# Patient Record
Sex: FEMALE | Race: WHITE | NOT HISPANIC OR LATINO | URBAN - METROPOLITAN AREA
[De-identification: names, ages, dates, MRNs, and addresses within clinical notes are randomized per-mention and may not be internally consistent; named-entity substitution may affect disease eponyms.]

---

## 2016-02-02 RX ORDER — WARFARIN SODIUM 2.5 MG/1
1 TABLET ORAL
Qty: 0 | Refills: 0 | DISCHARGE
Start: 2016-02-02

## 2020-03-09 ENCOUNTER — EMERGENCY (EMERGENCY)
Facility: HOSPITAL | Age: 85
LOS: 1 days | Discharge: LEFT BEFORE DISCHARGE | End: 2020-03-09
Attending: EMERGENCY MEDICINE
Payer: MEDICARE

## 2020-03-09 VITALS
DIASTOLIC BLOOD PRESSURE: 56 MMHG | TEMPERATURE: 98 F | RESPIRATION RATE: 16 BRPM | SYSTOLIC BLOOD PRESSURE: 100 MMHG | WEIGHT: 130.07 LBS | HEART RATE: 75 BPM | OXYGEN SATURATION: 98 %

## 2020-03-09 VITALS
DIASTOLIC BLOOD PRESSURE: 62 MMHG | TEMPERATURE: 98 F | RESPIRATION RATE: 16 BRPM | SYSTOLIC BLOOD PRESSURE: 109 MMHG | HEART RATE: 89 BPM | OXYGEN SATURATION: 98 %

## 2020-03-09 LAB
ALBUMIN SERPL ELPH-MCNC: 3.4 G/DL — LOW (ref 3.5–5)
ALP SERPL-CCNC: 104 U/L — SIGNIFICANT CHANGE UP (ref 40–120)
ALT FLD-CCNC: 21 U/L DA — SIGNIFICANT CHANGE UP (ref 10–60)
ANION GAP SERPL CALC-SCNC: 7 MMOL/L — SIGNIFICANT CHANGE UP (ref 5–17)
APTT BLD: 35 SEC — SIGNIFICANT CHANGE UP (ref 27.5–36.3)
AST SERPL-CCNC: 14 U/L — SIGNIFICANT CHANGE UP (ref 10–40)
BILIRUB SERPL-MCNC: 0.4 MG/DL — SIGNIFICANT CHANGE UP (ref 0.2–1.2)
BUN SERPL-MCNC: 16 MG/DL — SIGNIFICANT CHANGE UP (ref 7–18)
CALCIUM SERPL-MCNC: 8.9 MG/DL — SIGNIFICANT CHANGE UP (ref 8.4–10.5)
CHLORIDE SERPL-SCNC: 100 MMOL/L — SIGNIFICANT CHANGE UP (ref 96–108)
CO2 SERPL-SCNC: 29 MMOL/L — SIGNIFICANT CHANGE UP (ref 22–31)
CREAT SERPL-MCNC: 0.93 MG/DL — SIGNIFICANT CHANGE UP (ref 0.5–1.3)
DIGOXIN SERPL-MCNC: 0.8 NG/ML — SIGNIFICANT CHANGE UP (ref 0.8–2)
GLUCOSE SERPL-MCNC: 158 MG/DL — HIGH (ref 70–99)
HCT VFR BLD CALC: 34.3 % — LOW (ref 34.5–45)
HGB BLD-MCNC: 10.7 G/DL — LOW (ref 11.5–15.5)
INR BLD: 1.76 RATIO — HIGH (ref 0.88–1.16)
MAGNESIUM SERPL-MCNC: 2.3 MG/DL — SIGNIFICANT CHANGE UP (ref 1.6–2.6)
MCHC RBC-ENTMCNC: 25.7 PG — LOW (ref 27–34)
MCHC RBC-ENTMCNC: 31.2 GM/DL — LOW (ref 32–36)
MCV RBC AUTO: 82.3 FL — SIGNIFICANT CHANGE UP (ref 80–100)
NRBC # BLD: 0 /100 WBCS — SIGNIFICANT CHANGE UP (ref 0–0)
PLATELET # BLD AUTO: 268 K/UL — SIGNIFICANT CHANGE UP (ref 150–400)
POTASSIUM SERPL-MCNC: 3.9 MMOL/L — SIGNIFICANT CHANGE UP (ref 3.5–5.3)
POTASSIUM SERPL-SCNC: 3.9 MMOL/L — SIGNIFICANT CHANGE UP (ref 3.5–5.3)
PROT SERPL-MCNC: 8.1 G/DL — SIGNIFICANT CHANGE UP (ref 6–8.3)
PROTHROM AB SERPL-ACNC: 19.9 SEC — HIGH (ref 10–12.9)
RBC # BLD: 4.17 M/UL — SIGNIFICANT CHANGE UP (ref 3.8–5.2)
RBC # FLD: 14.3 % — SIGNIFICANT CHANGE UP (ref 10.3–14.5)
SODIUM SERPL-SCNC: 136 MMOL/L — SIGNIFICANT CHANGE UP (ref 135–145)
TROPONIN I SERPL-MCNC: <0.015 NG/ML — SIGNIFICANT CHANGE UP (ref 0–0.04)
WBC # BLD: 7.81 K/UL — SIGNIFICANT CHANGE UP (ref 3.8–10.5)
WBC # FLD AUTO: 7.81 K/UL — SIGNIFICANT CHANGE UP (ref 3.8–10.5)

## 2020-03-09 PROCEDURE — 85027 COMPLETE CBC AUTOMATED: CPT

## 2020-03-09 PROCEDURE — 80162 ASSAY OF DIGOXIN TOTAL: CPT

## 2020-03-09 PROCEDURE — 36415 COLL VENOUS BLD VENIPUNCTURE: CPT

## 2020-03-09 PROCEDURE — 99284 EMERGENCY DEPT VISIT MOD MDM: CPT

## 2020-03-09 PROCEDURE — 85610 PROTHROMBIN TIME: CPT

## 2020-03-09 PROCEDURE — 82962 GLUCOSE BLOOD TEST: CPT

## 2020-03-09 PROCEDURE — 84484 ASSAY OF TROPONIN QUANT: CPT

## 2020-03-09 PROCEDURE — 85730 THROMBOPLASTIN TIME PARTIAL: CPT

## 2020-03-09 PROCEDURE — 83735 ASSAY OF MAGNESIUM: CPT

## 2020-03-09 PROCEDURE — 80053 COMPREHEN METABOLIC PANEL: CPT

## 2020-03-09 PROCEDURE — 93005 ELECTROCARDIOGRAM TRACING: CPT

## 2020-03-09 PROCEDURE — 99283 EMERGENCY DEPT VISIT LOW MDM: CPT

## 2020-03-09 NOTE — ED PROVIDER NOTE - OBJECTIVE STATEMENT
89 y/o woman, h/ A-fib, DM, NHL, HTN, on digoxin, c/o generalized weakness and near-syncopal episode since about 4 pm today.  No CP/abd pain/SOB/fever/vomiting/focal weakness/injury.

## 2020-03-09 NOTE — ED PROVIDER NOTE - PMH
Afib    DM Type 2 (Diabetes Mellitus, Type 2)    History of Non-Hodgkin's Lymphoma    HTN (Hypertension)    Subdural Hemorrhage

## 2020-03-09 NOTE — ED PROVIDER NOTE - PATIENT PORTAL LINK FT
You can access the FollowMyHealth Patient Portal offered by [facility] by registering at the following website: [email or web address]. By joining [facility]’s FollowMyHealth portal, you will also be able to view your health information using other applications (apps) compatible with our system.

## 2020-03-09 NOTE — ED PROVIDER NOTE - CLINICAL SUMMARY MEDICAL DECISION MAKING FREE TEXT BOX
89 y/o woman, h/ A-fib, DM, NHL, HTN, on digoxin, c/o generalized weakness and near-syncopal episode since about 4 pm today--EKG, labs, reassess, anticipate admission.

## 2020-03-09 NOTE — ED PROVIDER NOTE - PROGRESS NOTE DETAILS
I advised Pt and her daughter that Pt should be admitted for generalized weakness and near-syncope, however they refuse.  They want to go to PMD Dr. Powell and specialist tomorrow.  I had extensive discussion of risks and benefits of pursuing further medical evaluation and/or care with patient and any available family/friends; patient still electing to leave against medical advice. Patient is awake, alert, oriented and demonstrates full capacity and insight into illness. Patient accepts responsibility for risks involved with leaving against medical advise as explained by me in detail.  Patient aware and encouraged to return immediately to ED or nearest ED if patient decides to change mind regarding care or if patient experiences any new, worsening, or concerning symptoms.

## 2020-03-09 NOTE — ED ADULT NURSE NOTE - NSIMPLEMENTINTERV_GEN_ALL_ED
Implemented All Fall Risk Interventions:  [place] to call system. Call bell, personal items and telephone within reach. Instruct patient to call for assistance. Room bathroom lighting operational. Non-slip footwear when patient is off stretcher. Physically safe environment: no spills, clutter or unnecessary equipment. Stretcher in lowest position, wheels locked, appropriate side rails in place. Provide visual cue, wrist band, yellow gown, etc. Monitor gait and stability. Monitor for mental status changes and reorient to person, place, and time. Review medications for side effects contributing to fall risk. Reinforce activity limits and safety measures with patient and family.

## 2021-05-28 ENCOUNTER — INPATIENT (INPATIENT)
Facility: HOSPITAL | Age: 86
LOS: 0 days | Discharge: HOME | DRG: 812 | End: 2021-05-29
Attending: INTERNAL MEDICINE | Admitting: INTERNAL MEDICINE
Payer: COMMERCIAL

## 2021-05-28 VITALS
TEMPERATURE: 98 F | OXYGEN SATURATION: 99 % | HEIGHT: 63 IN | RESPIRATION RATE: 16 BRPM | DIASTOLIC BLOOD PRESSURE: 60 MMHG | SYSTOLIC BLOOD PRESSURE: 124 MMHG | HEART RATE: 84 BPM

## 2021-05-28 LAB
ALBUMIN SERPL ELPH-MCNC: 3.1 G/DL — LOW (ref 3.5–5)
ALP SERPL-CCNC: 75 U/L — SIGNIFICANT CHANGE UP (ref 40–120)
ALT FLD-CCNC: 14 U/L DA — SIGNIFICANT CHANGE UP (ref 10–60)
ANION GAP SERPL CALC-SCNC: 9 MMOL/L — SIGNIFICANT CHANGE UP (ref 5–17)
APTT BLD: 33.8 SEC — SIGNIFICANT CHANGE UP (ref 27.5–35.5)
AST SERPL-CCNC: 18 U/L — SIGNIFICANT CHANGE UP (ref 10–40)
BASOPHILS # BLD AUTO: 0.02 K/UL — SIGNIFICANT CHANGE UP (ref 0–0.2)
BASOPHILS NFR BLD AUTO: 0.5 % — SIGNIFICANT CHANGE UP (ref 0–2)
BILIRUB SERPL-MCNC: 0.2 MG/DL — SIGNIFICANT CHANGE UP (ref 0.2–1.2)
BUN SERPL-MCNC: 26 MG/DL — HIGH (ref 7–18)
CALCIUM SERPL-MCNC: 8.5 MG/DL — SIGNIFICANT CHANGE UP (ref 8.4–10.5)
CHLORIDE SERPL-SCNC: 106 MMOL/L — SIGNIFICANT CHANGE UP (ref 96–108)
CO2 SERPL-SCNC: 22 MMOL/L — SIGNIFICANT CHANGE UP (ref 22–31)
CREAT SERPL-MCNC: 1.04 MG/DL — SIGNIFICANT CHANGE UP (ref 0.5–1.3)
EOSINOPHIL # BLD AUTO: 0.07 K/UL — SIGNIFICANT CHANGE UP (ref 0–0.5)
EOSINOPHIL NFR BLD AUTO: 1.6 % — SIGNIFICANT CHANGE UP (ref 0–6)
GLUCOSE SERPL-MCNC: 164 MG/DL — HIGH (ref 70–99)
HCT VFR BLD CALC: 18.7 % — CRITICAL LOW (ref 34.5–45)
HGB BLD-MCNC: 5.4 G/DL — CRITICAL LOW (ref 11.5–15.5)
IMM GRANULOCYTES NFR BLD AUTO: 0.5 % — SIGNIFICANT CHANGE UP (ref 0–1.5)
INR BLD: 2.59 RATIO — HIGH (ref 0.88–1.16)
LYMPHOCYTES # BLD AUTO: 0.78 K/UL — LOW (ref 1–3.3)
LYMPHOCYTES # BLD AUTO: 17.7 % — SIGNIFICANT CHANGE UP (ref 13–44)
MCHC RBC-ENTMCNC: 21.6 PG — LOW (ref 27–34)
MCHC RBC-ENTMCNC: 28.9 GM/DL — LOW (ref 32–36)
MCV RBC AUTO: 74.8 FL — LOW (ref 80–100)
MONOCYTES # BLD AUTO: 0.54 K/UL — SIGNIFICANT CHANGE UP (ref 0–0.9)
MONOCYTES NFR BLD AUTO: 12.3 % — SIGNIFICANT CHANGE UP (ref 2–14)
NEUTROPHILS # BLD AUTO: 2.97 K/UL — SIGNIFICANT CHANGE UP (ref 1.8–7.4)
NEUTROPHILS NFR BLD AUTO: 67.4 % — SIGNIFICANT CHANGE UP (ref 43–77)
NRBC # BLD: 0 /100 WBCS — SIGNIFICANT CHANGE UP (ref 0–0)
PLATELET # BLD AUTO: 228 K/UL — SIGNIFICANT CHANGE UP (ref 150–400)
POTASSIUM SERPL-MCNC: 4.3 MMOL/L — SIGNIFICANT CHANGE UP (ref 3.5–5.3)
POTASSIUM SERPL-SCNC: 4.3 MMOL/L — SIGNIFICANT CHANGE UP (ref 3.5–5.3)
PROT SERPL-MCNC: 6.7 G/DL — SIGNIFICANT CHANGE UP (ref 6–8.3)
PROTHROM AB SERPL-ACNC: 29.5 SEC — HIGH (ref 10.6–13.6)
RBC # BLD: 2.5 M/UL — LOW (ref 3.8–5.2)
RBC # FLD: 15.7 % — HIGH (ref 10.3–14.5)
SODIUM SERPL-SCNC: 137 MMOL/L — SIGNIFICANT CHANGE UP (ref 135–145)
WBC # BLD: 4.4 K/UL — SIGNIFICANT CHANGE UP (ref 3.8–10.5)
WBC # FLD AUTO: 4.4 K/UL — SIGNIFICANT CHANGE UP (ref 3.8–10.5)

## 2021-05-28 PROCEDURE — 99284 EMERGENCY DEPT VISIT MOD MDM: CPT

## 2021-05-28 NOTE — ED ADULT TRIAGE NOTE - ACCOMPANIED BY
Pt calling and stating the [facility] has sent us several faxes for cath renewal.  I do not see those faxes anywhere.  I gave pt our fax number and he is going to call [facility] where he gets his caths and have them refax us the order.  PRIYANKA Juarez, CMA     EMT/paramedic

## 2021-05-28 NOTE — ED ADULT NURSE NOTE - NSIMPLEMENTINTERV_GEN_ALL_ED
Notified of FTS within range through NTD.    Down Syndrome/ risk after screenin /> 10,000  Trisomy 18/13/ risk after screenin /> 10,000  ALDO-A 80%.  
Implemented All Fall with Harm Risk Interventions:  [place] to call system. Call bell, personal items and telephone within reach. Instruct patient to call for assistance. Room bathroom lighting operational. Non-slip footwear when patient is off stretcher. Physically safe environment: no spills, clutter or unnecessary equipment. Stretcher in lowest position, wheels locked, appropriate side rails in place. Provide visual cue, wrist band, yellow gown, etc. Monitor gait and stability. Monitor for mental status changes and reorient to person, place, and time. Review medications for side effects contributing to fall risk. Reinforce activity limits and safety measures with patient and family. Provide visual clues: red socks.

## 2021-05-28 NOTE — ED PROVIDER NOTE - OBJECTIVE STATEMENT
89 year old female PMH dementia, afib on coumadin, HTN, DM coming in with anemia. Hgb is 5.5 on outpatient labs. As per pts family has hx of iron deficiency anemia and was on iron therapy for awhile and her hgb was good but most recent hgb decreased. States she has seen a GI doctor and had an endoscopy somewhat recently and no bleeding site was identified. Pt states she feels very tired but denies all other complaints. as per family no abd pains, N/V/D/C, melena, hematuria, cp, sob, palpitations.

## 2021-05-29 ENCOUNTER — TRANSCRIPTION ENCOUNTER (OUTPATIENT)
Age: 86
End: 2021-05-29

## 2021-05-29 VITALS
TEMPERATURE: 98 F | RESPIRATION RATE: 16 BRPM | HEART RATE: 92 BPM | DIASTOLIC BLOOD PRESSURE: 74 MMHG | SYSTOLIC BLOOD PRESSURE: 140 MMHG | OXYGEN SATURATION: 100 %

## 2021-05-29 DIAGNOSIS — I10 ESSENTIAL (PRIMARY) HYPERTENSION: ICD-10-CM

## 2021-05-29 DIAGNOSIS — I48.91 UNSPECIFIED ATRIAL FIBRILLATION: ICD-10-CM

## 2021-05-29 DIAGNOSIS — D64.9 ANEMIA, UNSPECIFIED: ICD-10-CM

## 2021-05-29 DIAGNOSIS — Z98.890 OTHER SPECIFIED POSTPROCEDURAL STATES: Chronic | ICD-10-CM

## 2021-05-29 DIAGNOSIS — E11.9 TYPE 2 DIABETES MELLITUS WITHOUT COMPLICATIONS: ICD-10-CM

## 2021-05-29 DIAGNOSIS — Z29.9 ENCOUNTER FOR PROPHYLACTIC MEASURES, UNSPECIFIED: ICD-10-CM

## 2021-05-29 LAB
APPEARANCE UR: CLEAR — SIGNIFICANT CHANGE UP
BILIRUB UR-MCNC: NEGATIVE — SIGNIFICANT CHANGE UP
COLOR SPEC: YELLOW — SIGNIFICANT CHANGE UP
DIFF PNL FLD: ABNORMAL
FERRITIN SERPL-MCNC: 8 NG/ML — LOW (ref 15–150)
FOLATE SERPL-MCNC: 9.7 NG/ML — SIGNIFICANT CHANGE UP
GLUCOSE BLDC GLUCOMTR-MCNC: 139 MG/DL — HIGH (ref 70–99)
GLUCOSE BLDC GLUCOMTR-MCNC: 144 MG/DL — HIGH (ref 70–99)
GLUCOSE BLDC GLUCOMTR-MCNC: 175 MG/DL — HIGH (ref 70–99)
GLUCOSE UR QL: NEGATIVE — SIGNIFICANT CHANGE UP
HAPTOGLOB SERPL-MCNC: 157 MG/DL — SIGNIFICANT CHANGE UP (ref 34–200)
HCT VFR BLD CALC: 23.7 % — LOW (ref 34.5–45)
HCT VFR BLD CALC: 31 % — LOW (ref 34.5–45)
HGB BLD-MCNC: 7.2 G/DL — LOW (ref 11.5–15.5)
HGB BLD-MCNC: 9.5 G/DL — LOW (ref 11.5–15.5)
IRON SATN MFR SERPL: 13 UG/DL — LOW (ref 40–150)
IRON SATN MFR SERPL: 2 % — LOW (ref 15–50)
KETONES UR-MCNC: NEGATIVE — SIGNIFICANT CHANGE UP
LDH SERPL L TO P-CCNC: 161 U/L — SIGNIFICANT CHANGE UP (ref 120–225)
LEUKOCYTE ESTERASE UR-ACNC: ABNORMAL
MCHC RBC-ENTMCNC: 23.4 PG — LOW (ref 27–34)
MCHC RBC-ENTMCNC: 24.4 PG — LOW (ref 27–34)
MCHC RBC-ENTMCNC: 30.4 GM/DL — LOW (ref 32–36)
MCHC RBC-ENTMCNC: 30.6 GM/DL — LOW (ref 32–36)
MCV RBC AUTO: 76.9 FL — LOW (ref 80–100)
MCV RBC AUTO: 79.5 FL — LOW (ref 80–100)
NITRITE UR-MCNC: NEGATIVE — SIGNIFICANT CHANGE UP
NRBC # BLD: 0 /100 WBCS — SIGNIFICANT CHANGE UP (ref 0–0)
NRBC # BLD: 0 /100 WBCS — SIGNIFICANT CHANGE UP (ref 0–0)
PH UR: 5 — SIGNIFICANT CHANGE UP (ref 5–8)
PLATELET # BLD AUTO: 211 K/UL — SIGNIFICANT CHANGE UP (ref 150–400)
PLATELET # BLD AUTO: 252 K/UL — SIGNIFICANT CHANGE UP (ref 150–400)
PROT UR-MCNC: NEGATIVE — SIGNIFICANT CHANGE UP
RBC # BLD: 2.46 M/UL — LOW (ref 3.8–5.2)
RBC # BLD: 3.08 M/UL — LOW (ref 3.8–5.2)
RBC # BLD: 3.9 M/UL — SIGNIFICANT CHANGE UP (ref 3.8–5.2)
RBC # FLD: 16.6 % — HIGH (ref 10.3–14.5)
RBC # FLD: 17.2 % — HIGH (ref 10.3–14.5)
RETICS #: 46.2 K/UL — SIGNIFICANT CHANGE UP (ref 25–125)
RETICS/RBC NFR: 1.9 % — SIGNIFICANT CHANGE UP (ref 0.5–2.5)
SARS-COV-2 RNA SPEC QL NAA+PROBE: SIGNIFICANT CHANGE UP
SP GR SPEC: 1.02 — SIGNIFICANT CHANGE UP (ref 1.01–1.02)
TIBC SERPL-MCNC: 519 UG/DL — HIGH (ref 250–450)
UIBC SERPL-MCNC: 506 UG/DL — HIGH (ref 110–370)
UROBILINOGEN FLD QL: NEGATIVE — SIGNIFICANT CHANGE UP
WBC # BLD: 5.38 K/UL — SIGNIFICANT CHANGE UP (ref 3.8–10.5)
WBC # BLD: 8.27 K/UL — SIGNIFICANT CHANGE UP (ref 3.8–10.5)
WBC # FLD AUTO: 5.38 K/UL — SIGNIFICANT CHANGE UP (ref 3.8–10.5)
WBC # FLD AUTO: 8.27 K/UL — SIGNIFICANT CHANGE UP (ref 3.8–10.5)

## 2021-05-29 PROCEDURE — 80053 COMPREHEN METABOLIC PANEL: CPT

## 2021-05-29 PROCEDURE — 82746 ASSAY OF FOLIC ACID SERUM: CPT

## 2021-05-29 PROCEDURE — 85045 AUTOMATED RETICULOCYTE COUNT: CPT

## 2021-05-29 PROCEDURE — 85025 COMPLETE CBC W/AUTO DIFF WBC: CPT

## 2021-05-29 PROCEDURE — 99285 EMERGENCY DEPT VISIT HI MDM: CPT

## 2021-05-29 PROCEDURE — 83540 ASSAY OF IRON: CPT

## 2021-05-29 PROCEDURE — 36430 TRANSFUSION BLD/BLD COMPNT: CPT

## 2021-05-29 PROCEDURE — 82962 GLUCOSE BLOOD TEST: CPT

## 2021-05-29 PROCEDURE — 93005 ELECTROCARDIOGRAM TRACING: CPT

## 2021-05-29 PROCEDURE — 86923 COMPATIBILITY TEST ELECTRIC: CPT

## 2021-05-29 PROCEDURE — 87077 CULTURE AEROBIC IDENTIFY: CPT

## 2021-05-29 PROCEDURE — 93010 ELECTROCARDIOGRAM REPORT: CPT

## 2021-05-29 PROCEDURE — P9040: CPT

## 2021-05-29 PROCEDURE — 87186 SC STD MICRODIL/AGAR DIL: CPT

## 2021-05-29 PROCEDURE — 86900 BLOOD TYPING SEROLOGIC ABO: CPT

## 2021-05-29 PROCEDURE — 85027 COMPLETE CBC AUTOMATED: CPT

## 2021-05-29 PROCEDURE — 85730 THROMBOPLASTIN TIME PARTIAL: CPT

## 2021-05-29 PROCEDURE — 86901 BLOOD TYPING SEROLOGIC RH(D): CPT

## 2021-05-29 PROCEDURE — 83010 ASSAY OF HAPTOGLOBIN QUANT: CPT

## 2021-05-29 PROCEDURE — 87635 SARS-COV-2 COVID-19 AMP PRB: CPT

## 2021-05-29 PROCEDURE — 81001 URINALYSIS AUTO W/SCOPE: CPT

## 2021-05-29 PROCEDURE — 86850 RBC ANTIBODY SCREEN: CPT

## 2021-05-29 PROCEDURE — 87086 URINE CULTURE/COLONY COUNT: CPT

## 2021-05-29 PROCEDURE — 82728 ASSAY OF FERRITIN: CPT

## 2021-05-29 PROCEDURE — 36415 COLL VENOUS BLD VENIPUNCTURE: CPT

## 2021-05-29 PROCEDURE — 83550 IRON BINDING TEST: CPT

## 2021-05-29 PROCEDURE — 85610 PROTHROMBIN TIME: CPT

## 2021-05-29 PROCEDURE — 83615 LACTATE (LD) (LDH) ENZYME: CPT

## 2021-05-29 RX ORDER — PANTOPRAZOLE SODIUM 20 MG/1
1 TABLET, DELAYED RELEASE ORAL
Qty: 30 | Refills: 0
Start: 2021-05-29 | End: 2021-06-27

## 2021-05-29 RX ORDER — DIGOXIN 250 MCG
125 TABLET ORAL DAILY
Refills: 0 | Status: DISCONTINUED | OUTPATIENT
Start: 2021-05-29 | End: 2021-05-29

## 2021-05-29 RX ORDER — FERROUS SULFATE 325(65) MG
1 TABLET ORAL
Qty: 60 | Refills: 0
Start: 2021-05-29 | End: 2021-06-27

## 2021-05-29 RX ORDER — LANOLIN ALCOHOL/MO/W.PET/CERES
1 CREAM (GRAM) TOPICAL
Qty: 0 | Refills: 0 | DISCHARGE
Start: 2021-05-29

## 2021-05-29 RX ORDER — FUROSEMIDE 40 MG
20 TABLET ORAL ONCE
Refills: 0 | Status: COMPLETED | OUTPATIENT
Start: 2021-05-29 | End: 2021-05-29

## 2021-05-29 RX ORDER — HYDRALAZINE HCL 50 MG
12.5 TABLET ORAL DAILY
Refills: 0 | Status: DISCONTINUED | OUTPATIENT
Start: 2021-05-29 | End: 2021-05-29

## 2021-05-29 RX ORDER — INSULIN LISPRO 100/ML
VIAL (ML) SUBCUTANEOUS AT BEDTIME
Refills: 0 | Status: DISCONTINUED | OUTPATIENT
Start: 2021-05-29 | End: 2021-05-29

## 2021-05-29 RX ORDER — LANOLIN ALCOHOL/MO/W.PET/CERES
3 CREAM (GRAM) TOPICAL AT BEDTIME
Refills: 0 | Status: DISCONTINUED | OUTPATIENT
Start: 2021-05-29 | End: 2021-05-29

## 2021-05-29 RX ORDER — IRON SUCROSE 20 MG/ML
200 INJECTION, SOLUTION INTRAVENOUS ONCE
Refills: 0 | Status: COMPLETED | OUTPATIENT
Start: 2021-05-29 | End: 2021-05-29

## 2021-05-29 RX ORDER — LOSARTAN POTASSIUM 100 MG/1
50 TABLET, FILM COATED ORAL DAILY
Refills: 0 | Status: DISCONTINUED | OUTPATIENT
Start: 2021-05-29 | End: 2021-05-29

## 2021-05-29 RX ORDER — WARFARIN SODIUM 2.5 MG/1
1 TABLET ORAL
Qty: 0 | Refills: 0 | DISCHARGE

## 2021-05-29 RX ORDER — LOSARTAN POTASSIUM 100 MG/1
1 TABLET, FILM COATED ORAL
Qty: 0 | Refills: 0 | DISCHARGE
Start: 2021-05-29

## 2021-05-29 RX ORDER — IRON SUCROSE 20 MG/ML
200 INJECTION, SOLUTION INTRAVENOUS ONCE
Refills: 0 | Status: DISCONTINUED | OUTPATIENT
Start: 2021-05-29 | End: 2021-05-29

## 2021-05-29 RX ORDER — METFORMIN HYDROCHLORIDE 850 MG/1
0 TABLET ORAL
Qty: 0 | Refills: 0 | DISCHARGE

## 2021-05-29 RX ORDER — INSULIN LISPRO 100/ML
VIAL (ML) SUBCUTANEOUS
Refills: 0 | Status: DISCONTINUED | OUTPATIENT
Start: 2021-05-29 | End: 2021-05-29

## 2021-05-29 RX ORDER — WARFARIN SODIUM 2.5 MG/1
2 TABLET ORAL AT BEDTIME
Refills: 0 | Status: DISCONTINUED | OUTPATIENT
Start: 2021-05-29 | End: 2021-05-29

## 2021-05-29 RX ORDER — PANTOPRAZOLE SODIUM 20 MG/1
40 TABLET, DELAYED RELEASE ORAL
Refills: 0 | Status: DISCONTINUED | OUTPATIENT
Start: 2021-05-29 | End: 2021-05-29

## 2021-05-29 RX ORDER — METFORMIN HYDROCHLORIDE 850 MG/1
1 TABLET ORAL
Qty: 0 | Refills: 0 | DISCHARGE

## 2021-05-29 RX ADMIN — PANTOPRAZOLE SODIUM 40 MILLIGRAM(S): 20 TABLET, DELAYED RELEASE ORAL at 06:04

## 2021-05-29 RX ADMIN — Medication 20 MILLIGRAM(S): at 14:28

## 2021-05-29 RX ADMIN — Medication 12.5 MILLIGRAM(S): at 06:35

## 2021-05-29 RX ADMIN — LOSARTAN POTASSIUM 50 MILLIGRAM(S): 100 TABLET, FILM COATED ORAL at 06:02

## 2021-05-29 RX ADMIN — IRON SUCROSE 110 MILLIGRAM(S): 20 INJECTION, SOLUTION INTRAVENOUS at 08:51

## 2021-05-29 RX ADMIN — Medication 125 MICROGRAM(S): at 06:02

## 2021-05-29 NOTE — DISCHARGE NOTE PROVIDER - NSDCCPCAREPLAN_GEN_ALL_CORE_FT
PRINCIPAL DISCHARGE DIAGNOSIS  Diagnosis: Anemia, unspecified type  Assessment and Plan of Treatment:       SECONDARY DISCHARGE DIAGNOSES  Diagnosis: Afib  Assessment and Plan of Treatment: Afib     PRINCIPAL DISCHARGE DIAGNOSIS  Diagnosis: Anemia, unspecified type  Assessment and Plan of Treatment: Symptoms to report, bleeding, palpitations, fatigue, pale skin, cold skin, dizziness. Take medications as ordered by PCP        SECONDARY DISCHARGE DIAGNOSES  Diagnosis: Afib  Assessment and Plan of Treatment: Atrial fibrillation is the most common heart rhythm problem.  The condition puts you at risk for has stroke and heart attack  It helps if you control your blood pressure, not drink more than 1-2 alcohol drinks per day, cut down on caffeine, getting treatment for over active thyroid gland, and get regular exercise  Call your doctor if you feel your heart racing or beating unusually, chest tightness or pain, lightheaded, faint, shortness of breath especially with exercise  It is important to take your heart medication as prescribed  You may be on anticoagulation which is very important to take as directed - you may need blood work to monitor drug levels  -Hold your coumadin tonight on 5/29/21 and you should resume it on your normal schedule on 5/30/21.       PRINCIPAL DISCHARGE DIAGNOSIS  Diagnosis: Anemia, unspecified type  Assessment and Plan of Treatment: Symptoms to report, bleeding, palpitations, fatigue, pale skin, cold skin, dizziness. Take medications as ordered by PCP.  You were found to have iron deficiency anemia, most likely from a bleeding source in your gastrointestinal system.  You were given 2 units of blood, started on a proton pump inhibitor, and started on iron supplementation.        SECONDARY DISCHARGE DIAGNOSES  Diagnosis: Afib  Assessment and Plan of Treatment: Atrial fibrillation is the most common heart rhythm problem.  The condition puts you at risk for has stroke and heart attack  It helps if you control your blood pressure, not drink more than 1-2 alcohol drinks per day, cut down on caffeine, getting treatment for over active thyroid gland, and get regular exercise  Call your doctor if you feel your heart racing or beating unusually, chest tightness or pain, lightheaded, faint, shortness of breath especially with exercise  It is important to take your heart medication as prescribed  You may be on anticoagulation which is very important to take as directed - you may need blood work to monitor drug levels  -Hold your coumadin tonight on 5/29/21 and you should resume it on your normal schedule on 5/30/21.

## 2021-05-29 NOTE — H&P ADULT - PROBLEM SELECTOR PLAN 5
IMPROVE VTE Individual Risk Assessment  RISK                                                                Points  [  ] Previous VTE                                                  3  [  ] Thrombophilia                                               2  [  ] Lower limb paralysis                                      2        (unable to hold up >15 seconds)    [  ] Current Cancer                                              2         (within 6 months)  [ x ] Immobilization > 24 hrs                                1  [  ] ICU/CCU stay > 24 hours                              1  [ x ] Age > 60                                                      1  IMPROVE VTE Score _____2____  Hold off chemical DVT ppx 2/2 anemia

## 2021-05-29 NOTE — DISCHARGE NOTE PROVIDER - HOSPITAL COURSE
89 year old Romanian speaking female, lives alone with 24 hr HHA (Tue to Fri), stays with daughter (Sat. Sun. Mon), walks with walker, PMH of dementia, CVA, afib on coumadin, HTN, DM, Non Hodgkin's Lymphoma, colon ca (s/p colectomy, 75% was removed) and R breast ca (s/p lumpectomy) s/p chemo, diastolic HF and iron deficiency anemia was sent to the ED for anemia. Hgb is 5.5 on outpatient labs. Pt's daughter called PCP as pt felt very tired and PCP ordered labs. As per pts family has hx of iron deficiency anemia and was on iron therapy for awhile and her hgb was good but most recent hgb decreased.  Daughter states pt has seen a GI doctor (Dr. Persaud [phone]) and had an endoscopy somewhat recently and no bleeding site was identified.  As per daughter, Pt only had weakness and SOB on exertion, otherwise no abd pain, dizziness, N/V/D/C, melena, hematuria, cp, palpitations.  Of note, pt's daughter doesn't want to talk about "cancer" in front of the pt. Pt doesn't know about her cancer hx per daughter.    GOC: FULL CODE (trial of CPR as per daughter) (29 May 2021 00:05)   89 year old Moroccan speaking female, lives alone with 24 hr HHA (Tue to Fri), stays with daughter (Sat. Sun. Mon), walks with walker, PMH of dementia, CVA, afib on coumadin, HTN, DM, Non Hodgkin's Lymphoma, colon ca (s/p colectomy, 75% was removed) and R breast ca (s/p lumpectomy) s/p chemo, diastolic HF and iron deficiency anemia was sent to the ED for anemia. Hgb is 5.5 on outpatient labs. Pt's daughter called PCP as pt felt very tired and PCP ordered labs. As per pts family has hx of iron deficiency anemia and was on iron therapy for awhile and her hgb was good but most recent hgb decreased.  Daughter states pt has seen a GI doctor (Dr. Persaud [phone]) and had an endoscopy somewhat recently and no bleeding site was identified.  As per daughter, Pt only had weakness and SOB on exertion, otherwise no abd pain, dizziness, N/V/D/C, melena, hematuria, cp, palpitations.    She was given 2 units of PRBC with lasix given in between.  Dr. Jordan recommended further work-up however daughter is refusing and wants to pursue outpatient hematology follow-up.  Was seen by Dr. Hickey from hematology who thought anemia likely from GI source, given recent therapeutic INR, coumadin was held on 5/28 and 5/29 with plans to resume as her outpatient schedule.  She was instructed to follow-up as soon as possible with her PCP/GI.     89 year old Australian speaking female, lives alone with 24 hr HHA (Tue to Fri), stays with daughter (Sat. Sun. Mon), walks with walker, PMH of dementia, CVA, afib on coumadin, HTN, DM, Non Hodgkin's Lymphoma, colon ca (s/p colectomy, 75% was removed) and R breast ca (s/p lumpectomy) s/p chemo, diastolic HF and iron deficiency anemia was sent to the ED for anemia. Hgb is 5.5 on outpatient labs. Pt's daughter called PCP as pt felt very tired and PCP ordered labs. As per pts family has hx of iron deficiency anemia and was on iron therapy for awhile and her hgb was good but most recent hgb decreased.  Daughter states pt has seen a GI doctor (Dr. Persaud [phone]) and had an endoscopy somewhat recently and no bleeding site was identified.  As per daughter, Pt only had weakness and SOB on exertion, otherwise no abd pain, dizziness, N/V/D/C, melena, hematuria, cp, palpitations.    She was given 2 units of PRBC with lasix given in between.  Dr. Jordan recommended further work-up however daughter is refusing and wants to pursue outpatient hematology follow-up.  Was seen by Dr. Hickey from hematology who thought anemia likely from GI source, given recent therapeutic INR, coumadin was held on 5/28 and 5/29 with plans to resume as her outpatient schedule.  She was instructed to follow-up as soon as possible with her PCP/GI and was discharged on iron supplementation.    89 year old [place] Islander speaking female, lives alone with 24 hr HHA (Tue to Fri), stays with daughter (Sat. Sun. Mon), walks with walker, PMH of dementia, CVA, afib on coumadin, HTN, DM, Non Hodgkin's Lymphoma, colon ca (s/p colectomy, 75% was removed) and R breast ca (s/p lumpectomy) s/p chemo, diastolic HF and iron deficiency anemia was sent to the ED for anemia. Hgb is 5.5 on outpatient labs. Pt's daughter called PCP as pt felt very tired and PCP ordered labs. As per pts family has hx of iron deficiency anemia and was on iron therapy for awhile and her hgb was good but most recent hgb decreased.  Daughter states pt has seen a GI doctor (Dr. Persaud [phone]) and had an endoscopy somewhat recently and no bleeding site was identified.  As per daughter, Pt only had weakness and SOB on exertion, otherwise no abd pain, dizziness, N/V/D/C, melena, hematuria, cp, palpitations.    She was given 2 units of PRBC with lasix given in between and remained hemodynamically stable.  Dr. Jordan recommended further work-up however daughter is refusing and wants to pursue outpatient hematology follow-up.  Was seen by Dr. Hickey from hematology who thought anemia likely from GI source, given recent therapeutic INR, coumadin was held on 5/28 and 5/29 with plans to resume as her outpatient schedule.  She was instructed to follow-up as soon as possible with her PCP/GI and was discharged on iron supplementation and a proton pump inhibitor.   89 year old Namibian speaking female, lives alone with 24 hr HHA (Tue to Fri), stays with daughter (Sat. Sun. Mon), walks with walker, PMH of dementia, CVA, afib on coumadin, HTN, DM, Non Hodgkin's Lymphoma, colon ca (s/p colectomy, 75% was removed) and R breast ca (s/p lumpectomy) s/p chemo, diastolic HF and iron deficiency anemia was sent to the ED for anemia. Hgb is 5.5 on outpatient labs. Pt's daughter called PCP as pt felt very tired and PCP ordered labs. As per pts family has hx of iron deficiency anemia and was on iron therapy for awhile and her hgb was good but most recent hgb decreased.  Daughter states pt has seen a GI doctor (Dr. Persaud [phone]) and had an endoscopy somewhat recently and no bleeding site was identified.  As per daughter, Pt only had weakness and SOB on exertion, otherwise no abd pain, dizziness, N/V/D/C, melena, hematuria, cp, palpitations.    She was given 2 units of PRBC with lasix given in between and remained hemodynamically stable.  Dr. Jordan recommended further work-up however daughter is refusing and wants to pursue outpatient hematology follow-up.  Was seen by Dr. Hickey from hematology who thought anemia likely from GI source, given recent therapeutic INR, coumadin was held on 5/28 and 5/29 with plans to resume as her outpatient schedule.  She was instructed to follow-up as soon as possible with her PCP/GI/hematologist and was discharged on iron supplementation and a proton pump inhibitor.   89 year old Icelandic speaking female, lives alone with 24 hr HHA (Tue to Fri), stays with daughter (Sat. Sun. Mon), walks with walker, PMH of dementia, CVA, afib on coumadin, HTN, DM, Non Hodgkin's Lymphoma, colon ca (s/p colectomy, 75% was removed) and R breast ca (s/p lumpectomy) s/p chemo, diastolic HF and iron deficiency anemia was sent to the ED for anemia. Hgb is 5.5 on outpatient labs. Pt's daughter called PCP as pt felt very tired and PCP ordered labs. As per pts family has hx of iron deficiency anemia and was on iron therapy for awhile and her hgb was good but most recent hgb decreased.  Daughter states pt has seen a GI doctor (Dr. Persaud [phone]) and had an endoscopy somewhat recently and no bleeding site was identified.  As per daughter, Pt only had weakness and SOB on exertion, otherwise no abd pain, dizziness, N/V/D/C, melena, hematuria, cp, palpitations.    She was given 2 units of PRBC with lasix given in between and remained hemodynamically stable.  Dr. Jordan recommended further work-up however daughter is refusing and wants to pursue outpatient hematology follow-up.  Was seen by Dr. Hickey from hematology who thought anemia likely from GI source, given recent therapeutic INR, coumadin was held on 5/28 and 5/29 with plans to resume as her outpatient schedule.  She was instructed to follow-up as soon as possible with her PCP/GI/hematologist and was discharged on iron supplementation and a proton pump inhibitor.    Patients daughter at bedside and requesting discharge without ambulette services. Patient seen ambulating with minimal one person assistance. As per daughter they will take a taxi and her mother has a walker with which she can ambulate. The patients daughter verbalizes understanding  of the risks of her mother being discharged without a physical therapy evaluation and that her mother is high risk for falls and bleeding as she is on anticoagulation. She remains adamant that her mother be discharged. Patient medically stable for discharge with repeat CBC showing improvement of anemia post transfusion.

## 2021-05-29 NOTE — H&P ADULT - ASSESSMENT
89 year old Chadian speaking female, lives alone with 24 hr HHA (Tue to Fri), stays with daughter (Sat. Sun. Mon), walks with walker, PMH of dementia, CVA, afib on coumadin, HTN, DM, Non Hodgkin's Lymphoma, colon ca (s/p colectomy, 75% was removed) and R breast ca (s/p lumpectomy) s/p chemo, diastolic HF and iron deficiency anemia was sent to the ED for anemia. Hgb is 5.5 on outpatient labs. Pt was admitted for symptomatic anemia.     Of note, pt's daughter doesn't want to talk about "cancer" in front of the pt. Pt doesn't know about her cancer hx per daughter.    Outpt GI doctor : Dr. Persaud ([phone])    Pt's daughter called PCP as pt felt very tired and PCP ordered labs. As per pts family has hx of iron deficiency anemia and was on iron therapy for awhile and her hgb was good but most recent hgb decreased.  Daughter states pt has seen a and had an endoscopy somewhat recently and no bleeding site was identified.  As per daughter, Pt only had weakness and SOB on exertion, otherwise no abd pain, dizziness, N/V/D/C, melena, hematuria, cp, palpitations.  Of note, pt's daughter doesn't want to talk about "cancer" in front of the pt. Pt doesn't know about her cancer hx per daughter.    GOC: FULL CODE (trial of CPR as per daughter)

## 2021-05-29 NOTE — CONSULT NOTE ADULT - ASSESSMENT
89 year old lady on coumadin for Afib had anemia and received IV iron.  but recently became tired and anemic.  she has h/o colon ca, breast ca s/p surgery, chemo before.  she had ?redness of face, and ?agitation while on second unit of PRBC    1. JESUS MANUEL, she is on coumadin for afib.  most likley it is due to GIB  will give venofer  she will get more at [facility] after discharge    2.?transfusion rx  spoke with her daughter  the daughter said her face became a little red but she was agitated.  the redness has subsided  her vitals are stable  no wheezing, sob, rash, back pain  the swelling at arms and feet are her usual state  Hb heather to 7.2  haptoglobin normal  I doubt there is a transfusion rx  the daughter requests to have second unit  I think we can give the second unit but give benadryl 25 and decadron 8 mg before transfusion

## 2021-05-29 NOTE — DISCHARGE NOTE PROVIDER - NSDCMRMEDTOKEN_GEN_ALL_CORE_FT
digoxin 125 mcg (0.125 mg) oral tablet: 1 tab(s) orally once a day  GLIPIZIDE 5 MG TABLET: 1 tab(s) orally once a day  hydrochlorothiazide-losartan 12.5 mg-50 mg oral tablet: 1 tab(s) orally once a day  METFORMIN  MG TABLET: 1 tab(s) orally 2 times a day  warfarin 2 mg oral tablet: 1 tab(s) orally once a day on Tue, Wed, Fri, Sat, Sun  warfarin 3 mg oral tablet: 1 tab(s) orally once a day on Mon and Thu   digoxin 125 mcg (0.125 mg) oral tablet: 1 tab(s) orally once a day  FeroSul 325 mg (65 mg elemental iron) oral tablet: 1 tab(s) orally 2 times a day   GLIPIZIDE 5 MG TABLET: 1 tab(s) orally once a day  hydrochlorothiazide-losartan 12.5 mg-50 mg oral tablet: 1 tab(s) orally once a day  losartan 50 mg oral tablet: 1 tab(s) orally once a day  melatonin 3 mg oral tablet: 1 tab(s) orally once a day (at bedtime)  METFORMIN  MG TABLET: 1 tab(s) orally 2 times a day  Protonix 40 mg oral delayed release tablet: 1 tab(s) orally once a day  warfarin 2 mg oral tablet: 1 tab(s) orally once a day on Tue, Wed, Fri, Sat, Sun  warfarin 3 mg oral tablet: 1 tab(s) orally once a day on Mon and Thu

## 2021-05-29 NOTE — CONSULT NOTE ADULT - SUBJECTIVE AND OBJECTIVE BOX
Time of visit:    CHIEF COMPLAINT: Patient is a 89y old  Female who presents with a chief complaint of Symptomatic anemia (29 May 2021 15:31)      HPI:  89 year old Kosovan speaking female, lives alone with 24 hr HHA (Tue to Fri), stays with daughter (Sat. Sun. Mon), walks with walker, PMH of dementia, CVA, afib on coumadin, HTN, DM, Non Hodgkin's Lymphoma, colon ca (s/p colectomy, 75% was removed) and R breast ca (s/p lumpectomy) s/p chemo, diastolic HF and iron deficiency anemia was sent to the ED for anemia. Hgb is 5.5 on outpatient labs. Pt's daughter called PCP as pt felt very tired and PCP ordered labs. As per pts family has hx of iron deficiency anemia and was on iron therapy for awhile and her hgb was good but most recent hgb decreased.  Daughter states pt has seen a GI doctor (Dr. Persaud [phone]) and had an endoscopy somewhat recently and no bleeding site was identified.  As per daughter, Pt only had weakness and SOB on exertion, otherwise no abd pain, dizziness, N/V/D/C, melena, hematuria, cp, palpitations.  Of note, pt's daughter doesn't want to talk about "cancer" in front of the pt. Pt doesn't know about her cancer hx per daughter.    GOC: FULL CODE (trial of CPR as per daughter) (29 May 2021 00:05)   Patient seen and examined.     PAST MEDICAL & SURGICAL HISTORY:  DM Type 2 (Diabetes Mellitus, Type 2)    HTN (Hypertension)    Afib    History of Non-Hodgkin&#x27;s Lymphoma    Subdural Hemorrhage    Breast cancer  right    Colon cancer    History of Colon Resection    Status post right breast lumpectomy        Allergies    Motrin (Swelling; Rash)    Intolerances        MEDICATIONS  (STANDING):  digoxin     Tablet 125 MICROGram(s) Oral daily  hydrALAZINE 12.5 milliGRAM(s) Oral daily  insulin lispro (ADMELOG) corrective regimen sliding scale   SubCutaneous three times a day before meals  insulin lispro (ADMELOG) corrective regimen sliding scale   SubCutaneous at bedtime  losartan 50 milliGRAM(s) Oral daily  melatonin 3 milliGRAM(s) Oral at bedtime  pantoprazole  Injectable 40 milliGRAM(s) IV Push two times a day      MEDICATIONS  (PRN):   Medications up to date at time of exam.    Medications up to date at time of exam.    FAMILY HISTORY:      SOCIAL HISTORY  Smoking History: [   ] smoking/smoke exposure, [   ] former smoker  Living Condition: [   ] apartment, [   ] private house  Work History:   Travel History: denies recent travel  Illicit Substance Use: denies  Alcohol Use: denies    REVIEW OF SYSTEMS:    CONSTITUTIONAL:  denies fevers, chills, sweats, weight loss    HEENT:  denies diplopia or blurred vision, sore throat or runny nose.    CARDIOVASCULAR:  denies pressure, squeezing, tightness, or heaviness about the chest; no palpitations.    RESPIRATORY:  denies SOB, cough, LOUIS, wheezing.    GASTROINTESTINAL:  denies abdominal pain, nausea, vomiting or diarrhea.    GENITOURINARY: denies dysuria, frequency or urgency.    NEUROLOGIC:  denies numbness, tingling, seizures or weakness.    PSYCHIATRIC:  denies disorder of thought or mood.    MSK: denies swelling, redness      PHYSICAL EXAMINATION:    GENERAL: The patient is a well-developed, well-nourished, in no apparent distress.     Vital Signs Last 24 Hrs  T(C): 36.5 (29 May 2021 14:30), Max: 37.1 (28 May 2021 23:54)  T(F): 97.7 (29 May 2021 14:30), Max: 98.8 (28 May 2021 23:54)  HR: 78 (29 May 2021 14:30) (70 - 89)  BP: 133/60 (29 May 2021 14:30) (108/72 - 133/60)  BP(mean): --  RR: 16 (29 May 2021 14:30) (16 - 18)  SpO2: 99% (29 May 2021 14:30) (97% - 100%)   (if applicable)    Chest Tube (if applicable)    HEENT: Head is normocephalic and atraumatic. Extraocular muscles are intact. Mucous membranes are moist.     NECK: Supple, no palpable adenopathy.    LUNGS: Clear to auscultation, no wheezing, rales, or rhonchi.    HEART: Regular rate and rhythm without murmur.    ABDOMEN: Soft, nontender, and nondistended.  No hepatosplenomegaly is noted.    RENAL: No difficulty voiding, no pelvic pain    EXTREMITIES: Without any cyanosis, clubbing, rash, lesions or edema.    NEUROLOGIC: Awake, alert, oriented, grossly intact    SKIN: Warm, dry, good turgor.      LABS:                        7.2    5.38  )-----------( 252      ( 29 May 2021 08:10 )             23.7         137  |  106  |  26<H>  ----------------------------<  164<H>  4.3   |  22  |  1.04    Ca    8.5      28 May 2021 22:20    TPro  6.7  /  Alb  3.1<L>  /  TBili  0.2  /  DBili  x   /  AST  18  /  ALT  14  /  AlkPhos  75      PT/INR - ( 28 May 2021 22:20 )   PT: 29.5 sec;   INR: 2.59 ratio         PTT - ( 28 May 2021 22:20 )  PTT:33.8 sec  Urinalysis Basic - ( 29 May 2021 00:39 )    Color: Yellow / Appearance: Clear / S.020 / pH: x  Gluc: x / Ketone: Negative  / Bili: Negative / Urobili: Negative   Blood: x / Protein: Negative / Nitrite: Negative   Leuk Esterase: Trace / RBC: 2-5 /HPF / WBC 3-5 /HPF   Sq Epi: x / Non Sq Epi: Few /HPF / Bacteria: Few /HPF                      MICROBIOLOGY: (if applicable)    RADIOLOGY & ADDITIONAL STUDIES:  EKG:   CXR:< from: Xray Chest 1 View AP- PORTABLE-Urgent (Xray Chest 1 View AP- PORTABLE-Urgent .) (16 @ 09:15) >     EXAM:  CHEST-PORTABLE URGENT        PROCEDURE DATE:  2016      INTERPRETATION:  Portable chest     HISTORY: Chest pain, abnormal chest sounds.    COMPARISON: Multiple prior examinations, most recent chest x-ray dated   2011.    FINDINGS:   The lungs are clear. Cardiac silhouette is enlarged. There is a   left-sided cardiac pacer.    Degenerative changes are noted throughout the visualized spine.    IMPRESSION: As above.                 LANDEN HORAN M.D., ATTENDING RADIOLOGIST  This document has been electronically signed. 2016  9:27A                      < end of copied text >    ECHO:    IMPRESSION: 89y Female PAST MEDICAL & SURGICAL HISTORY:  DM Type 2 (Diabetes Mellitus, Type 2)    HTN (Hypertension)    Afib    History of Non-Hodgkin&#x27;s Lymphoma    Subdural Hemorrhage    Breast cancer  right    Colon cancer    History of Colon Resection    Status post right breast lumpectomy     p/w           IMP: This is a 89 year old Kosovan speaking woman , lives alone with 24 hr HHA (Tue to Fri), stays with daughter (Sat. Sun. Mon), walks with walker, PMH of dementia, CVA, afib on coumadin, HTN, DM, Non Hodgkin's Lymphoma x 30 yr ago at [facility] , colon ca (s/p colectomy, 75% was removed) and R breast ca (s/p lumpectomy) s/p chemo, diastolic HF and iron deficiency anemia was sent to the ED for anemia. Hgb is 5.5 on outpatient labs. Pat was extensively worked up by Dr Melton ( GI) neg studies. Pat do not have active bleeding at this time . Clinically improved post 1 unit PRBC       Sugg;  -transfuse 1 more unit PRBC   -continue meds   -out pat f/u with Heme and GI    spoke with daughter, PCP, GI attend       
Patient is a 89y old  Female who presents with a chief complaint of Symptomatic anemia (29 May 2021 00:05)      HPI:  89 year old Ivorian speaking female, lives alone with 24 hr HHA (Tue to Fri), stays with daughter (Sat. Sun. Mon), walks with walker, PMH of dementia, CVA, afib on coumadin, HTN, DM, Non Hodgkin's Lymphoma, colon ca (s/p colectomy, 75% was removed) and R breast ca (s/p lumpectomy) s/p chemo, diastolic HF and iron deficiency anemia was sent to the ED for anemia. Hgb is 5.5 on outpatient labs. Pt's daughter called PCP as pt felt very tired and PCP ordered labs. As per pts family has hx of iron deficiency anemia and was on iron therapy for awhile and her hgb was good but most recent hgb decreased.  Daughter states pt has seen a GI doctor (Dr. Persaud [phone]) and had an endoscopy somewhat recently and no bleeding site was identified.  As per daughter, Pt only had weakness and SOB on exertion, otherwise no abd pain, dizziness, N/V/D/C, melena, hematuria, cp, palpitations.  Of note, pt's daughter doesn't want to talk about "cancer" in front of the pt. Pt doesn't know about her cancer hx per daughter. her face became somewhat red and ?agitated while begining the second unit of PRBC.  the transfusion stopped.  her vitals are fine.  no sob, wheezing, rash etc.    GOC: FULL CODE (trial of CPR as per daughter) (29 May 2021 00:05)       ROS:  Negative except for:    PAST MEDICAL & SURGICAL HISTORY:  DM Type 2 (Diabetes Mellitus, Type 2)    HTN (Hypertension)    Afib    History of Non-Hodgkin&#x27;s Lymphoma    Subdural Hemorrhage    Breast cancer  right    Colon cancer    History of Colon Resection    Status post right breast lumpectomy        SOCIAL HISTORY:    FAMILY HISTORY:      MEDICATIONS  (STANDING):  digoxin     Tablet 125 MICROGram(s) Oral daily  furosemide   Injectable 20 milliGRAM(s) IV Push once  hydrALAZINE 12.5 milliGRAM(s) Oral daily  insulin lispro (ADMELOG) corrective regimen sliding scale   SubCutaneous three times a day before meals  insulin lispro (ADMELOG) corrective regimen sliding scale   SubCutaneous at bedtime  losartan 50 milliGRAM(s) Oral daily  melatonin 3 milliGRAM(s) Oral at bedtime  pantoprazole  Injectable 40 milliGRAM(s) IV Push two times a day  warfarin 2 milliGRAM(s) Oral at bedtime    MEDICATIONS  (PRN):      Allergies    Motrin (Swelling; Rash)    Intolerances        Vital Signs Last 24 Hrs  T(C): 36.6 (29 May 2021 12:00), Max: 37.1 (28 May 2021 23:54)  T(F): 97.8 (29 May 2021 12:00), Max: 98.8 (28 May 2021 23:54)  HR: 77 (29 May 2021 12:00) (70 - 89)  BP: 114/54 (29 May 2021 12:00) (108/72 - 126/54)  BP(mean): --  RR: 16 (29 May 2021 12:00) (16 - 18)  SpO2: 97% (29 May 2021 12:00) (97% - 100%)    PHYSICAL EXAM  General: adult in NAD  HEENT: clear oropharynx, anicteric sclera, pink conjunctiva  Neck: supple  CV: normal S1/S2 with no murmur rubs or gallops  Lungs: positive air movement b/l ant lungs,clear to auscultation, no wheezes, no rales  Abdomen: soft non-tender non-distended, no hepatosplenomegaly  Ext: no clubbing cyanosis or edema  Skin: no rashes and no petechiae  Neuro: alert and oriented X 4, no focal deficits      LABS:                          7.2    5.38  )-----------( 252      ( 29 May 2021 08:10 )             23.7         Mean Cell Volume : 76.9 fl  Mean Cell Hemoglobin : 23.4 pg  Mean Cell Hemoglobin Concentration : 30.4 gm/dL  Auto Neutrophil # : x  Auto Lymphocyte # : x  Auto Monocyte # : x  Auto Eosinophil # : x  Auto Basophil # : x  Auto Neutrophil % : x  Auto Lymphocyte % : x  Auto Monocyte % : x  Auto Eosinophil % : x  Auto Basophil % : x      Serial CBC's  05-29 @ 08:10  Hct-23.7 / Hgb-7.2 / Plat-252 / RBC-3.08 / WBC-5.38  Serial CBC's  05-29 @ 01:05  Hct--- / Hgb--- / Plat--- / RBC-2.46 / WBC---  Serial CBC's  05-28 @ 22:20  Hct-18.7 / Hgb-5.4 / Plat-228 / RBC-2.50 / WBC-4.40      05-28    137  |  106  |  26<H>  ----------------------------<  164<H>  4.3   |  22  |  1.04    Ca    8.5      28 May 2021 22:20    TPro  6.7  /  Alb  3.1<L>  /  TBili  0.2  /  DBili  x   /  AST  18  /  ALT  14  /  AlkPhos  75  05-28      PT/INR - ( 28 May 2021 22:20 )   PT: 29.5 sec;   INR: 2.59 ratio         PTT - ( 28 May 2021 22:20 )  PTT:33.8 sec    Folate, Serum: 9.7 ng/mL (05-29 @ 05:40)  Ferritin, Serum: 8 ng/mL (05-29 @ 05:40)  Iron - Total Binding Capacity.: 519 ug/dL (05-29 @ 01:05)  Reticulocyte Percent: 1.9 % (05-29 @ 01:05)              BLOOD SMEAR INTERPRETATION:       RADIOLOGY & ADDITIONAL STUDIES:

## 2021-05-29 NOTE — PROGRESS NOTE ADULT - SUBJECTIVE AND OBJECTIVE BOX
Patient is a 89y old  Female who presents with a chief complaint of Symptomatic anemia (29 May 2021 12:43)    PATIENT IS SEEN AND EXAMINED IN MEDICAL FLOOR.  NGT [    ]    RAI [   ]      GT [   ]    ALLERGIES:  Motrin (Swelling; Rash)      Daily Height in cm: 160.02 (28 May 2021 21:30)    Daily     VITALS:    Vital Signs Last 24 Hrs  T(C): 36.5 (29 May 2021 14:30), Max: 37.1 (28 May 2021 23:54)  T(F): 97.7 (29 May 2021 14:30), Max: 98.8 (28 May 2021 23:54)  HR: 78 (29 May 2021 14:30) (70 - 89)  BP: 133/60 (29 May 2021 14:30) (108/72 - 133/60)  BP(mean): --  RR: 16 (29 May 2021 14:30) (16 - 18)  SpO2: 99% (29 May 2021 14:30) (97% - 100%)    LABS:    CBC Full  -  ( 29 May 2021 08:10 )  WBC Count : 5.38 K/uL  RBC Count : 3.08 M/uL  Hemoglobin : 7.2 g/dL  Hematocrit : 23.7 %  Platelet Count - Automated : 252 K/uL  Mean Cell Volume : 76.9 fl  Mean Cell Hemoglobin : 23.4 pg  Mean Cell Hemoglobin Concentration : 30.4 gm/dL  Auto Neutrophil # : x  Auto Lymphocyte # : x  Auto Monocyte # : x  Auto Eosinophil # : x  Auto Basophil # : x  Auto Neutrophil % : x  Auto Lymphocyte % : x  Auto Monocyte % : x  Auto Eosinophil % : x  Auto Basophil % : x    PT/INR - ( 28 May 2021 22:20 )   PT: 29.5 sec;   INR: 2.59 ratio         PTT - ( 28 May 2021 22:20 )  PTT:33.8 sec  05-28    137  |  106  |  26<H>  ----------------------------<  164<H>  4.3   |  22  |  1.04    Ca    8.5      28 May 2021 22:20    TPro  6.7  /  Alb  3.1<L>  /  TBili  0.2  /  DBili  x   /  AST  18  /  ALT  14  /  AlkPhos  75  05-28    CAPILLARY BLOOD GLUCOSE      POCT Blood Glucose.: 144 mg/dL (29 May 2021 11:20)  POCT Blood Glucose.: 139 mg/dL (29 May 2021 07:43)        LIVER FUNCTIONS - ( 28 May 2021 22:20 )  Alb: 3.1 g/dL / Pro: 6.7 g/dL / ALK PHOS: 75 U/L / ALT: 14 U/L DA / AST: 18 U/L / GGT: x           Creatinine Trend: 1.04<--  I&O's Summary              MEDICATIONS:    MEDICATIONS  (STANDING):  digoxin     Tablet 125 MICROGram(s) Oral daily  hydrALAZINE 12.5 milliGRAM(s) Oral daily  insulin lispro (ADMELOG) corrective regimen sliding scale   SubCutaneous three times a day before meals  insulin lispro (ADMELOG) corrective regimen sliding scale   SubCutaneous at bedtime  losartan 50 milliGRAM(s) Oral daily  melatonin 3 milliGRAM(s) Oral at bedtime  pantoprazole  Injectable 40 milliGRAM(s) IV Push two times a day  warfarin 2 milliGRAM(s) Oral at bedtime      MEDICATIONS  (PRN):      REVIEW OF SYSTEMS:                           ALL ROS DONE [ X   ]    CONSTITUTIONAL:  LETHARGIC [   ], FEVER [   ], UNRESPONSIVE [   ]  CVS:  CP  [   ], SOB, [   ], PALPITATIONS [   ], DIZZYNESS [   ]  RS: COUGH [   ], SPUTUM [   ]  GI: ABDOMINAL PAIN [   ], NAUSEA [   ], VOMITINGS [   ], DIARRHEA [   ], CONSTIPATION [   ]  :  DYSURIA [   ], NOCTURIA [   ], INCREASED FREQUENCY [   ], DRIBLING [   ],  SKELETAL: PAINFUL JOINTS [   ], SWOLLEN JOINTS [   ], NECK ACHE [   ], LOW BACK ACHE [   ],  SKIN : ULCERS [   ], RASH [   ], ITCHING [   ]  CNS: HEAD ACHE [   ], DOUBLE VISION [   ], BLURRED VISION [   ], AMS / CONFUSION [   ], SEIZURES [   ], WEAKNESS [   ],TINGLING / NUMBNESS [   ]    PHYSICAL EXAMINATION:  GENERAL APPEARANCE: NO DISTRESS  HEENT:  NO PALLOR, NO  JVD,  NO   NODES, NECK SUPPLE  CVS: S1 +, S2 +,   RS: AEEB,  OCCASIONAL  RALES +,   NO RONCHI  ABD: SOFT, NT, NO, BS +  EXT: NO PE  SKIN: WARM,   SKELETAL:  ROM ACCEPTABLE  CNS:  AAO X  1-2, NO  DEFICITS    RADIOLOGY :    RADIOLOGY REVIEWED    ASSESSMENT :     Anemia    DM Type 2 (Diabetes Mellitus, Type 2)    HTN (Hypertension)    Afib    History of Non-Hodgkin&#x27;s Lymphoma    Subdural Hemorrhage    Breast cancer    Colon cancer    History of Colon Resection    Status post right breast lumpectomy        PLAN:  HPI:  89 year old Kittitian speaking female, lives alone with 24 hr HHA (Tue to Fri), stays with daughter (Sat. Sun. Mon), walks with walker, PMH of dementia, CVA, afib on coumadin, HTN, DM, Non Hodgkin's Lymphoma, colon ca (s/p colectomy, 75% was removed) and R breast ca (s/p lumpectomy) s/p chemo, diastolic HF and iron deficiency anemia was sent to the ED for anemia. Hgb is 5.5 on outpatient labs. Pt's daughter called PCP as pt felt very tired and PCP ordered labs. As per pts family has hx of iron deficiency anemia and was on iron therapy for awhile and her hgb was good but most recent hgb decreased.  Daughter states pt has seen a GI doctor (Dr. Persaud [phone]) and had an endoscopy somewhat recently and no bleeding site was identified.  As per daughter, Pt only had weakness and SOB on exertion, otherwise no abd pain, dizziness, N/V/D/C, melena, hematuria, cp, palpitations.  Of note, pt's daughter doesn't want to talk about "cancer" in front of the pt. Pt doesn't know about her cancer hx per daughter.    GOC: FULL CODE (trial of CPR as per daughter) (29 May 2021 00:05)    # HTN  # DM  # HX OF NONHODGKIN'S LYMPHOMA, COLON CA [S/P COLECTOMY, 75% WAS REMOVED], RIGHT BREAST CA [S/P LUMPECTOMY]  # AFIB ON COUMADIN  # DEMENTIA     Patient is a 89y old  Female who presents with a chief complaint of Symptomatic anemia (29 May 2021 12:43)    PATIENT IS SEEN AND EXAMINED IN MEDICAL FLOOR.    ALLERGIES:  Motrin (Swelling; Rash)    Daily Height in cm: 160.02 (28 May 2021 21:30)    Daily     VITALS:    Vital Signs Last 24 Hrs  T(C): 36.5 (29 May 2021 14:30), Max: 37.1 (28 May 2021 23:54)  T(F): 97.7 (29 May 2021 14:30), Max: 98.8 (28 May 2021 23:54)  HR: 78 (29 May 2021 14:30) (70 - 89)  BP: 133/60 (29 May 2021 14:30) (108/72 - 133/60)  BP(mean): --  RR: 16 (29 May 2021 14:30) (16 - 18)  SpO2: 99% (29 May 2021 14:30) (97% - 100%)    LABS:    CBC Full  -  ( 29 May 2021 08:10 )  WBC Count : 5.38 K/uL  RBC Count : 3.08 M/uL  Hemoglobin : 7.2 g/dL  Hematocrit : 23.7 %  Platelet Count - Automated : 252 K/uL  Mean Cell Volume : 76.9 fl  Mean Cell Hemoglobin : 23.4 pg  Mean Cell Hemoglobin Concentration : 30.4 gm/dL  Auto Neutrophil # : x  Auto Lymphocyte # : x  Auto Monocyte # : x  Auto Eosinophil # : x  Auto Basophil # : x  Auto Neutrophil % : x  Auto Lymphocyte % : x  Auto Monocyte % : x  Auto Eosinophil % : x  Auto Basophil % : x    PT/INR - ( 28 May 2021 22:20 )   PT: 29.5 sec;   INR: 2.59 ratio         PTT - ( 28 May 2021 22:20 )  PTT:33.8 sec  05-28    137  |  106  |  26<H>  ----------------------------<  164<H>  4.3   |  22  |  1.04    Ca    8.5      28 May 2021 22:20    TPro  6.7  /  Alb  3.1<L>  /  TBili  0.2  /  DBili  x   /  AST  18  /  ALT  14  /  AlkPhos  75  05-28    CAPILLARY BLOOD GLUCOSE      POCT Blood Glucose.: 144 mg/dL (29 May 2021 11:20)  POCT Blood Glucose.: 139 mg/dL (29 May 2021 07:43)        LIVER FUNCTIONS - ( 28 May 2021 22:20 )  Alb: 3.1 g/dL / Pro: 6.7 g/dL / ALK PHOS: 75 U/L / ALT: 14 U/L DA / AST: 18 U/L / GGT: x           Creatinine Trend: 1.04<--  I&O's Summary    MEDICATIONS:    MEDICATIONS  (STANDING):  digoxin     Tablet 125 MICROGram(s) Oral daily  hydrALAZINE 12.5 milliGRAM(s) Oral daily  insulin lispro (ADMELOG) corrective regimen sliding scale   SubCutaneous three times a day before meals  insulin lispro (ADMELOG) corrective regimen sliding scale   SubCutaneous at bedtime  losartan 50 milliGRAM(s) Oral daily  melatonin 3 milliGRAM(s) Oral at bedtime  pantoprazole  Injectable 40 milliGRAM(s) IV Push two times a day  warfarin 2 milliGRAM(s) Oral at bedtime      MEDICATIONS  (PRN):      REVIEW OF SYSTEMS:                           ALL ROS DONE [ X   ]    CONSTITUTIONAL:  LETHARGIC [   ], FEVER [   ], UNRESPONSIVE [   ]  CVS:  CP  [   ], SOB, [   ], PALPITATIONS [   ], DIZZYNESS [   ]  RS: COUGH [   ], SPUTUM [   ]  GI: ABDOMINAL PAIN [   ], NAUSEA [   ], VOMITINGS [   ], DIARRHEA [   ], CONSTIPATION [   ]  :  DYSURIA [   ], NOCTURIA [   ], INCREASED FREQUENCY [   ], DRIBLING [   ],  SKELETAL: PAINFUL JOINTS [   ], SWOLLEN JOINTS [   ], NECK ACHE [   ], LOW BACK ACHE [   ],  SKIN : ULCERS [   ], RASH [   ], ITCHING [   ]  CNS: HEAD ACHE [   ], DOUBLE VISION [   ], BLURRED VISION [   ], AMS / CONFUSION [   ], SEIZURES [   ], WEAKNESS [   ],TINGLING / NUMBNESS [   ]    PHYSICAL EXAMINATION:  GENERAL APPEARANCE: NO DISTRESS  HEENT:  NO PALLOR, NO  JVD,  NO   NODES, NECK SUPPLE  CVS: S1 +, S2 +,   RS: AEEB,  OCCASIONAL  RALES +,   NO RONCHI  ABD: SOFT, NT, NO, BS +  EXT: NO PE  SKIN: WARM,   SKELETAL:  ROM ACCEPTABLE  CNS:  AAO X  1-2, NO  DEFICITS    RADIOLOGY :    RADIOLOGY REVIEWED    ASSESSMENT :     Anemia    DM Type 2 (Diabetes Mellitus, Type 2)    HTN (Hypertension)    Afib    History of Non-Hodgkin&#x27;s Lymphoma    Subdural Hemorrhage    Breast cancer    Colon cancer    History of Colon Resection    Status post right breast lumpectomy        PLAN:  HPI:  89 year old Gambian speaking female, lives alone with 24 hr HHA (Tue to Fri), stays with daughter (Sat. Sun. Mon), walks with walker, PMH of dementia, CVA, afib on coumadin, HTN, DM, Non Hodgkin's Lymphoma, colon ca (s/p colectomy, 75% was removed) and R breast ca (s/p lumpectomy) s/p chemo, diastolic HF and iron deficiency anemia was sent to the ED for anemia. Hgb is 5.5 on outpatient labs. Pt's daughter called PCP as pt felt very tired and PCP ordered labs. As per pts family has hx of iron deficiency anemia and was on iron therapy for awhile and her hgb was good but most recent hgb decreased.  Daughter states pt has seen a GI doctor (Dr. Cunningham [phone]) and had an endoscopy somewhat recently and no bleeding site was identified.  As per daughter, Pt only had weakness and SOB on exertion, otherwise no abd pain, dizziness, N/V/D/C, melena, hematuria, cp, palpitations.  Of note, pt's daughter doesn't want to talk about "cancer" in front of the pt. Pt doesn't know about her cancer hx per daughter.    GOC: FULL CODE (trial of CPR as per daughter) (29 May 2021 00:05)    # ACUTE ON CHRONIC NORMOCYTIC ANEMIA - W/ HX OF KNOWN JESUS MANUEL - GIVEN 1 UNIT PRBC [PLANNING FOR SECOND UNIT], GIVEN VENOFER, CONCERN OVERNIGHT FOR TRANSFUSION REACTION [HEME/ONC EVALUATED AND RECOMMENDED PRE-TREATING WITH DECADRON AND LASIX FOR 2ND UNIT PRBC]  - DAUGHTER REPORTS PATIENT HAS UNDERGONE GI WORKUP WITH DR. CUNNINGHAM - W/OUT EVIDENCE OF BLEEDING  - PATIENT WAS SEEING HEME/ONC AS OUTPATIENT BUT WAS DUE FOR FURTHER FOLLOWUP/WORKUP - AS THERE IS CONCERN THAT SHE MAY NEED BONE MARROW BX TO R/O MDS  # CHRONIC DIASTOLIC HF - DOES NOT APPEAR DECOMPENSATED  # HTN  # DM - SSI + FS  # HX OF NONHODGKIN'S LYMPHOMA, COLON CA [S/P COLECTOMY, 75% WAS REMOVED], RIGHT BREAST CA [S/P LUMPECTOMY]  # AFIB ON COUMADIN - HOLDING COUMADIN - WILL RESUME AFTER DISCUSSING WITH HEME/ONC  # DEMENTIA  # HX OF CVA  # CASE D/W DAUGHTER AT BEDSIDE - SHE IS INSISTENT ON BRINGING PATIENT HOME AND IS REFUSING FURTHER IN PATIENT WORKUP AS SHE REPORTS THEY WILL HAVE CONSISTENT AND EXPEDITED OUTPATIENT FOLLOW-UP. SHE UNDERSTANDS THE RISKS OF LEAVING PRIOR TO COMPLETION OF INPATIENT WORKUP + MONITORING INCLUDING BLEEDING AND DEATH.   # GI PPX

## 2021-05-29 NOTE — H&P ADULT - PROBLEM SELECTOR PLAN 1
Hb 5.4, pt has weakness and LOUIS  giving 2U (1st unit finished at 5/29 5:20am)  Pt is on Coumadin (3mg on Mon/Thu, 2mg on other 5 days), INR 2.59  No black stool, hematuria, obvious signs of bleed   pt was being given IV iron outpatient  iron sat low, TIBC high, ret count WNL, LDH WNL - iron deficiency anemia  EGD recently was normal per daughter, only 25% colon left due to colectomy  Hold off Coumadin tonight  f/u post-transfusion CBC  Daughter wants to take her home after transfusion

## 2021-05-29 NOTE — CHART NOTE - NSCHARTNOTEFT_GEN_A_CORE
Pt. seen and examined at bedside.  Received 1 unit of PRBC overnight, had flushed face and confusion, so 2nd unit of PRBC was held. Vitals were stable and afebrile. No intervention was done overnight.  Flushing has improved, was seen by heme/onc who did not feel it was a transfusion reaction.  IV access infiltrated and was not able to be obtained today.    ICU Vital Signs Last 24 Hrs  T(C): 36.5 (29 May 2021 14:30), Max: 37.1 (28 May 2021 23:54)  T(F): 97.7 (29 May 2021 14:30), Max: 98.8 (28 May 2021 23:54)  HR: 78 (29 May 2021 14:30) (70 - 89)  BP: 133/60 (29 May 2021 14:30) (108/72 - 133/60)  RR: 16 (29 May 2021 14:30) (16 - 18)  SpO2: 99% (29 May 2021 14:30) (97% - 100%)    PE:  AAOx2, NAD  S1, S2, irr/irr, no MGR  Lungs with bibasilar crackles, no wheezing/rhonchi.  +BS, abd s/nt/nd s kimberly/guarding  No LE edema. Pt. seen and examined at bedside.  Received 1 unit of PRBC overnight, had flushed face and confusion, so 2nd unit of PRBC was held. Vitals were stable and afebrile. No intervention was done overnight.  Flushing has improved, was seen by heme/onc who did not feel it was a transfusion reaction.  IV access infiltrated and was not able to be re-obtained until now.    ICU Vital Signs Last 24 Hrs  T(C): 36.5 (29 May 2021 14:30), Max: 37.1 (28 May 2021 23:54)  T(F): 97.7 (29 May 2021 14:30), Max: 98.8 (28 May 2021 23:54)  HR: 78 (29 May 2021 14:30) (70 - 89)  BP: 133/60 (29 May 2021 14:30) (108/72 - 133/60)  RR: 16 (29 May 2021 14:30) (16 - 18)  SpO2: 99% (29 May 2021 14:30) (97% - 100%)    PE:  AAOx2, NAD  S1, S2, irr/irr, no MGR  Lungs with bibasilar crackles, no wheezing/rhonchi.  +BS, abd s/nt/nd s kimberly/guarding  No LE edema.    1) Iron def anemia: Seen by heme/onc, rec to try benadryl and decadron prior to 2nd unit of PRBC however daughter is refusing as she does not feel she had a transfusion reaction.  Give lasix 20mg IV prior to 2nd unit as she had rales and has hx of HFpEF.    Shiva Plummer, NAHID Pt. seen and examined at bedside.  Received 1 unit of PRBC overnight, had flushed face and confusion, so 2nd unit of PRBC was held. Vitals were stable and afebrile. No intervention was done overnight.  Flushing has improved, was seen by heme/onc who did not feel it was a transfusion reaction.  IV access infiltrated and was not able to be re-obtained until now.    ICU Vital Signs Last 24 Hrs  T(C): 36.5 (29 May 2021 14:30), Max: 37.1 (28 May 2021 23:54)  T(F): 97.7 (29 May 2021 14:30), Max: 98.8 (28 May 2021 23:54)  HR: 78 (29 May 2021 14:30) (70 - 89)  BP: 133/60 (29 May 2021 14:30) (108/72 - 133/60)  RR: 16 (29 May 2021 14:30) (16 - 18)  SpO2: 99% (29 May 2021 14:30) (97% - 100%)    PE:  AAOx2, NAD  S1, S2, irr/irr, no MGR  Lungs with bibasilar crackles, no wheezing/rhonchi.  +BS, abd s/nt/nd s kimberly/guarding  No LE edema.    1) Iron def anemia: Seen by heme/onc, rec to try benadryl and decadron prior to 2nd unit of PRBC however daughter is refusing as she does not feel she had a transfusion reaction.  Give lasix 20mg IV prior to 2nd unit as she had rales and has hx of HFpEF.  2) AF, hx of CVA on coumadin.  Reviewed with Dr. Hickey, can hold tonight's dose as well and resume normal dosing tomorrow. Aim for INR around 2.0 if possible.    Shiva Plummer NP

## 2021-05-29 NOTE — H&P ADULT - NSICDXPASTSURGICALHX_GEN_ALL_CORE_FT
PAST SURGICAL HISTORY:  History of Colon Resection      PAST SURGICAL HISTORY:  History of Colon Resection     Status post right breast lumpectomy

## 2021-05-29 NOTE — H&P ADULT - HISTORY OF PRESENT ILLNESS
89 year old Finnish speaking female with HHA, PMH of dementia, CVA, afib on coumadin, HTN, DM, Non Hodgkin's Lymphoma, iron deficiency anemia was sent to the ED for anemia. Hgb is 5.5 on outpatient labs. As per pts family has hx of iron deficiency anemia and was on iron therapy for awhile and her hgb was good but most recent hgb decreased. States she has seen a GI doctor and had an endoscopy somewhat recently and no bleeding site was identified. Pt states she feels very tired but denies all other complaints. as per family no abd pains, N/V/D/C, melena, hematuria, cp, sob, palpitations.   89 year old Tristanian speaking female, lives alone with 24 hr HHA (Tue to Fri), stays with daughter (Sat. Sun. Mon), walks with walker, PMH of dementia, CVA, afib on coumadin, HTN, DM, Non Hodgkin's Lymphoma, colon ca (s/p colectomy, 75% was removed) and R breast ca (s/p lumpectomy) s/p chemo, diastolic HF and iron deficiency anemia was sent to the ED for anemia. Hgb is 5.5 on outpatient labs. Pt's daughter called PCP as pt felt very tired and PCP ordered labs. As per pts family has hx of iron deficiency anemia and was on iron therapy for awhile and her hgb was good but most recent hgb decreased.  Daughter states pt has seen a GI doctor (Dr. Persaud [phone]) and had an endoscopy somewhat recently and no bleeding site was identified.  As per daughter, Pt only had weakness and SOB on exertion, otherwise no abd pain, dizziness, N/V/D/C, melena, hematuria, cp, palpitations.  Of note, pt's daughter doesn't want to talk about "cancer" in front of the pt. Pt doesn't know about her cancer hx per daughter.    GOC: FULL CODE (trial of CPR as per daughter)

## 2021-05-29 NOTE — DISCHARGE NOTE NURSING/CASE MANAGEMENT/SOCIAL WORK - PATIENT PORTAL LINK FT
You can access the FollowMyHealth Patient Portal offered by [facility] by registering at the following website: [email or web address]. By joining [facility]’s FollowMyHealth portal, you will also be able to view your health information using other applications (apps) compatible with our system.

## 2021-05-29 NOTE — H&P ADULT - NSICDXPASTMEDICALHX_GEN_ALL_CORE_FT
PAST MEDICAL HISTORY:  Afib     DM Type 2 (Diabetes Mellitus, Type 2)     History of Non-Hodgkin's Lymphoma     HTN (Hypertension)     Subdural Hemorrhage      PAST MEDICAL HISTORY:  Afib     Breast cancer right    Colon cancer     DM Type 2 (Diabetes Mellitus, Type 2)     History of Non-Hodgkin's Lymphoma     HTN (Hypertension)     Subdural Hemorrhage

## 2021-05-29 NOTE — ED CLERICAL - CLERICAL COMMENTS
NEG results obtained from [facility], please wait until 2:30 AM [facility]

## 2021-05-31 LAB
-  AMIKACIN: SIGNIFICANT CHANGE UP
-  AMOXICILLIN/CLAVULANIC ACID: SIGNIFICANT CHANGE UP
-  AMPICILLIN/SULBACTAM: SIGNIFICANT CHANGE UP
-  AMPICILLIN: SIGNIFICANT CHANGE UP
-  AMPICILLIN: SIGNIFICANT CHANGE UP
-  AZTREONAM: SIGNIFICANT CHANGE UP
-  CEFAZOLIN: SIGNIFICANT CHANGE UP
-  CEFEPIME: SIGNIFICANT CHANGE UP
-  CEFOXITIN: SIGNIFICANT CHANGE UP
-  CEFTRIAXONE: SIGNIFICANT CHANGE UP
-  CIPROFLOXACIN: SIGNIFICANT CHANGE UP
-  CIPROFLOXACIN: SIGNIFICANT CHANGE UP
-  ERTAPENEM: SIGNIFICANT CHANGE UP
-  GENTAMICIN: SIGNIFICANT CHANGE UP
-  IMIPENEM: SIGNIFICANT CHANGE UP
-  LEVOFLOXACIN: SIGNIFICANT CHANGE UP
-  LEVOFLOXACIN: SIGNIFICANT CHANGE UP
-  MEROPENEM: SIGNIFICANT CHANGE UP
-  NITROFURANTOIN: SIGNIFICANT CHANGE UP
-  NITROFURANTOIN: SIGNIFICANT CHANGE UP
-  PIPERACILLIN/TAZOBACTAM: SIGNIFICANT CHANGE UP
-  TETRACYCLINE: SIGNIFICANT CHANGE UP
-  TIGECYCLINE: SIGNIFICANT CHANGE UP
-  TOBRAMYCIN: SIGNIFICANT CHANGE UP
-  TRIMETHOPRIM/SULFAMETHOXAZOLE: SIGNIFICANT CHANGE UP
-  VANCOMYCIN: SIGNIFICANT CHANGE UP
CULTURE RESULTS: SIGNIFICANT CHANGE UP
METHOD TYPE: SIGNIFICANT CHANGE UP
METHOD TYPE: SIGNIFICANT CHANGE UP
ORGANISM # SPEC MICROSCOPIC CNT: SIGNIFICANT CHANGE UP
SPECIMEN SOURCE: SIGNIFICANT CHANGE UP

## 2021-10-07 ENCOUNTER — EMERGENCY (EMERGENCY)
Facility: HOSPITAL | Age: 86
LOS: 1 days | End: 2021-10-07
Attending: EMERGENCY MEDICINE
Payer: MEDICARE

## 2021-10-07 VITALS — HEIGHT: 63 IN

## 2021-10-07 DIAGNOSIS — Z98.890 OTHER SPECIFIED POSTPROCEDURAL STATES: Chronic | ICD-10-CM

## 2021-10-07 LAB
RAPID RVP RESULT: SIGNIFICANT CHANGE UP
SARS-COV-2 RNA SPEC QL NAA+PROBE: SIGNIFICANT CHANGE UP

## 2021-10-07 PROCEDURE — 99285 EMERGENCY DEPT VISIT HI MDM: CPT

## 2021-10-07 PROCEDURE — 0225U NFCT DS DNA&RNA 21 SARSCOV2: CPT

## 2021-10-07 NOTE — ED ADULT NURSE NOTE - NSICDXPASTMEDICALHX_GEN_ALL_CORE_FT
PAST MEDICAL HISTORY:  Afib     Breast cancer right    Colon cancer     DM Type 2 (Diabetes Mellitus, Type 2)     History of Non-Hodgkin's Lymphoma     HTN (Hypertension)     Subdural Hemorrhage

## 2021-10-07 NOTE — ED ADULT NURSE REASSESSMENT NOTE - NS ED NURSE REASSESS COMMENT FT1
7 pm .received the pt in bed . no pulse or respiration  expiration certified by Md . post mortuum care done  . organ donor called and COVID SWAB done by nate GARCIA. waiting for  home to take the  body . family at bedside

## 2021-10-07 NOTE — ED ADULT TRIAGE NOTE - CHIEF COMPLAINT QUOTE
PT WAS NOTIFICATION FOR POST CARDIAC ARREST. NO HR, NO BREATH SOUNDS, NO BP. EMS REPORTS PT WENT INTO CARDIAC ARREST AND WAS TOLD BY DAUGHTER NOT TO DO ANYTHING. DAUGHTER AT BEDSIDE NAME JOSH JAMIE

## 2021-10-07 NOTE — ED PROVIDER NOTE - NSICDXPASTSURGICALHX_GEN_ALL_CORE_FT
Nutrition Assessment    Type and Reason for Visit: Initial, Positive Nutrition Screen    Nutrition Recommendations: Ensure Enlive 4 x day    Nutrition Assessment: Pt is Severely Malnourished AEB wt loss and inadequate oral intake. Pt is at risk for further decline d/t catabolic illness. Will start Ensure Enlive 4 x day. Aware Hospice consult is pending. Malnutrition Assessment:  · Malnutrition Status: Meets the criteria for severe malnutrition  · Context: Chronic illness  · Findings of the 6 clinical characteristics of malnutrition (Minimum of 2 out of 6 clinical characteristics is required to make the diagnosis of moderate or severe Protein Calorie Malnutrition based on AND/ASPEN Guidelines):  1. Energy Intake-Less than or equal to 75% of estimated energy requirement, Greater than or equal to 1 month    2.  Weight Loss-20% loss or greater, in 1 year    Nutrition Risk Level: High    Nutrient Needs:  · Estimated Daily Total Kcal: 4664-3233 kcals/day  · Estimated Daily Protein (g): 58-75 g/PRO/day  · Estimated Daily Total Fluid (ml/day): 7092-7038 mL/day    Nutrition Diagnosis:   · Problem: Severe malnutrition  · Etiology: related to Catabolic illness     Signs and symptoms:  as evidenced by Diet history of poor intake, Weight loss greater than or equal to 20% in 1 year    Objective Information:  · Current Nutrition Therapies:  · Oral Diet Orders: General   · Anthropometric Measures:  · Ht: 5' 2\" (157.5 cm)   · Current Body Wt: 105 lb (47.6 kg)  · BMI Classification: BMI 18.5 - 24.9 Normal Weight    Nutrition Interventions:   Continue current diet, Start ONS  Continued Inpatient Monitoring    Nutrition Evaluation:   · Evaluation: Goals set   · Goals: Pt will consume 50% or more of meals and supplements    · Monitoring: Nutrition Progression, Meal Intake, Supplement Intake, Weight, Pertinent Labs      Electronically signed by Xin Blake MS, RD, LD on 3/6/20 at 2:43 PM    Contact Number: PAST SURGICAL HISTORY:  History of Colon Resection     Status post right breast lumpectomy

## 2021-10-07 NOTE — ED PROVIDER NOTE - PHYSICAL EXAMINATION
Unresponsive  Eyes open, staring, immobile  No corneal reflex  Unresponsive  No extrem movements  No auscultated heart beat or respirations  Apneic  Skin cool  GCS 3

## 2021-10-07 NOTE — ED PROVIDER NOTE - OBJECTIVE STATEMENT
89yoF with h/o HTN, DM, per daughter aggressive lymphoma with severe neck swelling, presents dead on arrival. Per daughter at bedside she was having trouble breathing 2/2 the swelling and EMS was called. However per EMS she arrested approx 615pm, and daughter requested that no further attempt to resuscitate be done, so EMS states CPR stopped CPR once placed in ambulance shortly thereafter. PEA, no pulse or spontaneous respirations at that time.

## 2021-10-07 NOTE — ED ADULT TRIAGE NOTE - MODE OF ARRIVAL
[facility], [facility]

## 2021-10-08 PROBLEM — C18.9 MALIGNANT NEOPLASM OF COLON, UNSPECIFIED: Chronic | Status: ACTIVE | Noted: 2021-05-29

## 2021-10-08 PROBLEM — C50.919 MALIGNANT NEOPLASM OF UNSPECIFIED SITE OF UNSPECIFIED FEMALE BREAST: Chronic | Status: ACTIVE | Noted: 2021-05-29
